# Patient Record
Sex: FEMALE | Race: WHITE
[De-identification: names, ages, dates, MRNs, and addresses within clinical notes are randomized per-mention and may not be internally consistent; named-entity substitution may affect disease eponyms.]

---

## 2020-07-17 ENCOUNTER — HOSPITAL ENCOUNTER (EMERGENCY)
Dept: HOSPITAL 74 - FER | Age: 60
LOS: 1 days | Discharge: HOME | End: 2020-07-18
Payer: COMMERCIAL

## 2020-07-17 VITALS — BODY MASS INDEX: 21.1 KG/M2

## 2020-07-17 DIAGNOSIS — R07.89: Primary | ICD-10-CM

## 2020-07-18 VITALS — HEART RATE: 73 BPM | SYSTOLIC BLOOD PRESSURE: 150 MMHG | DIASTOLIC BLOOD PRESSURE: 73 MMHG | TEMPERATURE: 98.1 F

## 2020-07-18 LAB
ALBUMIN SERPL-MCNC: 3.8 G/DL (ref 3.4–5)
ALP SERPL-CCNC: 60 U/L (ref 45–117)
ALT SERPL-CCNC: 21 U/L (ref 13–61)
ANION GAP SERPL CALC-SCNC: 7 MMOL/L (ref 8–16)
AST SERPL-CCNC: 16 U/L (ref 15–37)
BASOPHILS # BLD: 1.2 % (ref 0–2)
BILIRUB SERPL-MCNC: 0.3 MG/DL (ref 0.2–1)
BUN SERPL-MCNC: 14.8 MG/DL (ref 7–18)
CALCIUM SERPL-MCNC: 9.1 MG/DL (ref 8.5–10.1)
CHLORIDE SERPL-SCNC: 107 MMOL/L (ref 98–107)
CO2 SERPL-SCNC: 28 MMOL/L (ref 21–32)
CREAT SERPL-MCNC: 0.7 MG/DL (ref 0.55–1.3)
DEPRECATED RDW RBC AUTO: 14.5 % (ref 11.6–15.6)
EOSINOPHIL # BLD: 2.6 % (ref 0–4.5)
GLUCOSE SERPL-MCNC: 94 MG/DL (ref 74–106)
HCT VFR BLD CALC: 35.8 % (ref 32.4–45.2)
HGB BLD-MCNC: 12 GM/DL (ref 10.7–15.3)
LYMPHOCYTES # BLD: 34.4 % (ref 8–40)
MCH RBC QN AUTO: 29.4 PG (ref 25.7–33.7)
MCHC RBC AUTO-ENTMCNC: 33.6 G/DL (ref 32–36)
MCV RBC: 87.5 FL (ref 80–96)
MONOCYTES # BLD AUTO: 13 % (ref 3.8–10.2)
NEUTROPHILS # BLD: 48.8 % (ref 42.8–82.8)
PLATELET # BLD AUTO: 211 K/MM3 (ref 134–434)
PMV BLD: 9.9 FL (ref 7.5–11.1)
POTASSIUM SERPLBLD-SCNC: 3.4 MMOL/L (ref 3.5–5.1)
PROT SERPL-MCNC: 6.6 G/DL (ref 6.4–8.2)
RBC # BLD AUTO: 4.09 M/MM3 (ref 3.6–5.2)
SODIUM SERPL-SCNC: 142 MMOL/L (ref 136–145)
WBC # BLD AUTO: 6.9 K/MM3 (ref 4–10)

## 2020-07-18 PROCEDURE — 3E033GC INTRODUCTION OF OTHER THERAPEUTIC SUBSTANCE INTO PERIPHERAL VEIN, PERCUTANEOUS APPROACH: ICD-10-PCS

## 2020-07-18 NOTE — EKG
Test Reason : 

Blood Pressure : ***/*** mmHG

Vent. Rate : 069 BPM     Atrial Rate : 069 BPM

   P-R Int : 162 ms          QRS Dur : 086 ms

    QT Int : 408 ms       P-R-T Axes : -03 051 028 degrees

   QTc Int : 437 ms

 

NORMAL SINUS RHYTHM

NORMAL ECG

NO PREVIOUS ECGS AVAILABLE

Confirmed by RICCARDO URRUTIA MD ([id number]) on 7/18/2020 2:59:04 PM

 

Referred By:             Confirmed By:RICCARDO URRUTIA MD

## 2020-07-18 NOTE — PDOC
History of Present Illness





- General


Chief Complaint: Chest Pain


Stated Complaint: CHEST PAIN


Time Seen by Provider: 07/18/20 00:17





- History of Present Illness


Initial Comments: 





This 59-year-old woman with a history of hypothyroidism/HLD/MVP presents with 3 

days of pain below her left breast.  She states that the pain is sharp and 

occurs with deep breathing, movement of her upper torso, laughing; pain was mild

at first and has been worsening over the last 48 hours.  No history of shortness

of breath, cough, fever/chills.  She denies abdominal pain, nausea/vomiting.  

She cannot recall any overuse or trauma to the area.


Non-smoker, no history of HTN/DM/strong family history of coronary artery 

disease/obesity





Medications as noted below


Non-smoker; no daily alcohol or recreational drug use





07/18/20 01:32








Past History





- Medical History


Allergies/Adverse Reactions: 


                                    Allergies











Allergy/AdvReac Type Severity Reaction Status Date / Time


 


amoxicillin [From Augmentin] Allergy   Verified 07/17/20 23:57


 


clavulanic acid Allergy   Verified 07/17/20 23:57





[From Augmentin]     











Home Medications: 


Ambulatory Orders





Aspirin [Ecotrin] 81 mg PO DAILY 07/17/20 


Levothyroxine [Synthroid -] 88 mcg PO DAILY 07/17/20 


Simvastatin 40 mg PO DAILY 07/17/20 








COPD: No


Hypercholesterolemia: Yes


Thyroid Disease: Yes





- Psycho-Social/Smoking History


Smoking History: Never smoked





**Review of Systems





- Review of Systems


Able to Perform ROS?: Yes


Comments:: 





12 point review of systems is negative except for what is noted in the history 

of present illness











*Physical Exam





- Vital Signs


                                Last Vital Signs











Temp Pulse Resp BP Pulse Ox


 


 98.1 F   73   16   150/73   100 


 


 07/18/20 00:00  07/18/20 00:00  07/18/20 00:00  07/18/20 00:00  07/18/20 00:00














- Physical Exam





GENERAL: Adult female, alert and oriented x3, no acute distress


HEAD: Normal with no signs of trauma.


EYES: PERRLA, EOMI, sclera anicteric, conjunctiva clear.


ENT: Ears normal, nares patent, oropharynx clear without exudates.  Moist mucous

membranes.


NECK: Normal range of motion, supple without lymphadenopathy, JVD, or masses.


LUNGS: Breath sounds equal, clear to auscultation bilaterally.  No wheezes, and 

no crackles.  No bruits/rubs heard


CHEST WALL: Mild tenderness to palpation left sixth rib, midclavicular line; no 

crepitus or step-offs palpated


                        No other tenderness or other abnormality of chest wall 

noted.


HEART:Regular rate and rhythm, normal S1 and S2,2/6 systolic murmur left lower 

sternal border, no rub or gallop.


ABDOMEN:.normal bowel sounds  No guarding,tenderness or rebound.No masses No 

distention. 


EXTREMITIES: Normal range of motion, no edema.  No clubbing or cyanosis. No 

erythema, or tenderness.


NEUROLOGICAL: Cranial nerves II through XII grossly intact.  Normal speech.  No 

focal neurological deficits





Twelve-lead electrocardiogram performed: Normal sinus rhythm 69 bpm; intervals, 

axis and waveforms are all normal.  No evidence of acute ST or T wave 

abnormalities.  No evidence of acute cardiac arrhythmia.  No previous EKG 

tracings available for comparison


07/18/20 01:32








ED Treatment Course





- LABORATORY


CBC & Chemistry Diagram: 


                                 07/18/20 00:57





                                 07/18/20 00:57





Medical Decision Making





- Medical Decision Making





As noted above, this 59-year-old woman with a history of hyperlipidemia and 

hypothyroidism presents with few day history of left-sided chest pain, worse 

with deep breathing, laughing, twisting of upper body.  No associated symptoms. 

Exam as noted with mild tenderness noted at the area of pain.  EKG was normal as

noted above.





Because of the presence of risk factors, troponin as well as CBC and chemistry 

profile sent.


Toradol 30 mg IV administered while awaiting laboratory results





Patient reports significant decrease in her pain after IV Toradol


Laboratory values are essentially normal with no elevation of troponin; 

potassium was minimally decreased at 3.4 and the remainder of the values were 

unremarkable


Results discussed with the patient.


Clinical presentation most consistent with atypical chest pain, likely chest 

wall origin.  Also possible, though less likely would be pleuritis.


She was discharged with instructions to avoid strenuous upper body activity for 

the next few days.  She can take nonsteroidal anti-inflammatory medications 

(OTC) as needed for pain, taking  the doses with food at all times.  She should 

return to the ER if she has any shortness of breath, cough, fevers/chills.  She 

should follow-up with her general medical doctor within the next 3-4 days








Discharge





- Discharge Information


Problems reviewed: Yes


Clinical Impression/Diagnosis: 


 Atypical chest pain





Condition: Stable


Disposition: HOME





- Follow up/Referral





- Patient Discharge Instructions


Patient Printed Discharge Instructions:  DI for Atypical Chest Pain


Additional Instructions: 


rest, drink plenty of fluids


Ibuprofen/Acetaminophen/Naproxen as needed for pain


Always take ibuprofen/naproxen with food


avoid strenuous activity involving upper body for the next several days


followup with your doctor within the next 3-4 days


return to ER if you have persistent pain or develop shortness of breath, cough 

or fever








- Post Discharge Activity

## 2021-09-16 ENCOUNTER — HOSPITAL ENCOUNTER (OUTPATIENT)
Dept: HOSPITAL 74 - JASU-ENDO | Age: 61
Discharge: HOME | End: 2021-09-16
Attending: INTERNAL MEDICINE
Payer: COMMERCIAL

## 2021-09-16 VITALS — SYSTOLIC BLOOD PRESSURE: 109 MMHG | HEART RATE: 56 BPM | TEMPERATURE: 97.5 F | DIASTOLIC BLOOD PRESSURE: 67 MMHG

## 2021-09-16 VITALS — BODY MASS INDEX: 20.2 KG/M2

## 2021-09-16 DIAGNOSIS — Z12.11: Primary | ICD-10-CM

## 2021-09-16 DIAGNOSIS — Z80.0: ICD-10-CM

## 2021-09-16 DIAGNOSIS — K57.30: ICD-10-CM

## 2021-09-16 DIAGNOSIS — K64.8: ICD-10-CM

## 2021-09-16 PROCEDURE — 0DJD8ZZ INSPECTION OF LOWER INTESTINAL TRACT, VIA NATURAL OR ARTIFICIAL OPENING ENDOSCOPIC: ICD-10-PCS | Performed by: INTERNAL MEDICINE

## 2022-06-25 ENCOUNTER — HOSPITAL ENCOUNTER (EMERGENCY)
Dept: HOSPITAL 74 - FER | Age: 62
Discharge: HOME | End: 2022-06-25
Payer: COMMERCIAL

## 2022-06-25 VITALS — DIASTOLIC BLOOD PRESSURE: 68 MMHG | HEART RATE: 80 BPM | SYSTOLIC BLOOD PRESSURE: 107 MMHG | TEMPERATURE: 99 F

## 2022-06-25 VITALS — BODY MASS INDEX: 20.9 KG/M2

## 2022-06-25 DIAGNOSIS — H00.011: Primary | ICD-10-CM
